# Patient Record
Sex: FEMALE | Race: WHITE | NOT HISPANIC OR LATINO | URBAN - NONMETROPOLITAN AREA
[De-identification: names, ages, dates, MRNs, and addresses within clinical notes are randomized per-mention and may not be internally consistent; named-entity substitution may affect disease eponyms.]

---

## 2017-07-25 ENCOUNTER — OFFICE VISIT (OUTPATIENT)
Dept: OBSTETRICS AND GYNECOLOGY | Facility: CLINIC | Age: 22
End: 2017-07-25

## 2017-07-25 VITALS
SYSTOLIC BLOOD PRESSURE: 116 MMHG | WEIGHT: 187 LBS | HEIGHT: 69 IN | DIASTOLIC BLOOD PRESSURE: 68 MMHG | BODY MASS INDEX: 27.7 KG/M2

## 2017-07-25 DIAGNOSIS — Z30.014 ENCOUNTER FOR INITIAL PRESCRIPTION OF INTRAUTERINE CONTRACEPTIVE DEVICE: Primary | ICD-10-CM

## 2017-07-25 PROCEDURE — 99204 OFFICE O/P NEW MOD 45 MIN: CPT | Performed by: PHYSICIAN ASSISTANT

## 2017-07-25 RX ORDER — FEXOFENADINE HYDROCHLORIDE 60 MG/1
60 TABLET, FILM COATED ORAL DAILY
COMMUNITY

## 2017-07-25 NOTE — PROGRESS NOTES
"Subjective   Chief Complaint   Patient presents with   • Contraception     iud consult     /68  Ht 69\" (175.3 cm)  Wt 187 lb (84.8 kg)  LMP 2017  BMI 27.62 kg/m2   Stefany Diaz is a 21 y.o. year old new patient  presenting to be seen for consultation regarding contraception  She has been on oc's in past and d/c these 2016  She is interested in IUD-wants convenient birth control  Menses regular and LMP  2017  Sexually active-monogamous    Past Medical History:   Diagnosis Date   • Anxiety    • Depression         Current Outpatient Prescriptions:   •  fexofenadine (ALLEGRA) 60 MG tablet, Take 60 mg by mouth Daily., Disp: , Rfl:   •  Multiple Vitamins-Minerals (MULTIVITAMIN ADULT PO), Take  by mouth., Disp: , Rfl:     Current Facility-Administered Medications:   •  [START ON 2017] Levonorgestrel 13.5 MG IUD 1 each, 1 each, Intrauterine, Once, Marlyn Baxter PA-C   No Known Allergies   Past Surgical History:   Procedure Laterality Date   • WISDOM TOOTH EXTRACTION        Social History     Social History   • Marital status: Single     Spouse name: N/A   • Number of children: N/A   • Years of education: N/A     Occupational History   • Not on file.     Social History Main Topics   • Smoking status: Never Smoker   • Smokeless tobacco: Never Used   • Alcohol use No   • Drug use: No   • Sexual activity: Yes     Birth control/ protection: None     Other Topics Concern   • Not on file     Social History Narrative   • No narrative on file      Family History   Problem Relation Age of Onset   • Hypertension Father        The following portions of the patient's history were reviewed and updated as appropriate:problem list, current medications, allergies, past family history, past medical history, past social history and past surgical history.    Review of Systems   HENT: Negative.    Gastrointestinal: Negative.    Genitourinary: Negative.    All other systems reviewed and are " negative.       Objective     Physical Exam   Constitutional: She appears well-developed and well-nourished. She is cooperative.   Eyes: Conjunctivae and lids are normal.   Neck: No thyroid mass and no thyromegaly present.   Cardiovascular: Normal rate, regular rhythm and normal heart sounds.    Pulmonary/Chest: Effort normal and breath sounds normal.   Abdominal: Soft. Normal appearance. She exhibits no distension and no mass. There is no tenderness.   Genitourinary: Vagina normal and uterus normal. There is no tenderness or lesion on the right labia. There is no tenderness or lesion on the left labia. Cervix exhibits no motion tenderness and no discharge. Right adnexum displays no mass and no tenderness. Left adnexum displays no mass and no tenderness.   Skin: Skin is warm, dry and intact.   Psychiatric: She has a normal mood and affect. Her behavior is normal.     Stefany was seen today for contraception.    Diagnoses and all orders for this visit:    Encounter for initial prescription of intrauterine contraceptive device  -     Levonorgestrel 13.5 MG IUD 1 each; 1 each by Intrauterine route 1 (One) Time.  -     Chlamydia Trachomatis, Neisseria Gonorrhoeae, Trichomonas Vaginalis, GLORIA             This note was electronically signed.    Marlyn Baxter PA-C   July 25, 2017

## 2017-07-25 NOTE — PATIENT INSTRUCTIONS
Discussed all options for contraception-patient desires Elba-discussed benefits/side effects  Screening for GC/CT done prior to IUD insertion  Call first day of menses for Elba insertion

## 2017-07-26 LAB
C TRACH RRNA SPEC QL NAA+PROBE: NEGATIVE
N GONORRHOEA RRNA SPEC QL NAA+PROBE: NEGATIVE
T VAGINALIS RRNA SPEC QL NAA+PROBE: NEGATIVE

## 2017-07-26 NOTE — PROGRESS NOTES
I have reviewed the notes, assessments, and/or procedures performed by   Diana Bxater, I concur with her/his documentation of Stefany Diaz.

## 2017-08-03 ENCOUNTER — OFFICE VISIT (OUTPATIENT)
Dept: OBSTETRICS AND GYNECOLOGY | Facility: CLINIC | Age: 22
End: 2017-08-03

## 2017-08-03 VITALS
BODY MASS INDEX: 27.7 KG/M2 | DIASTOLIC BLOOD PRESSURE: 68 MMHG | WEIGHT: 187 LBS | SYSTOLIC BLOOD PRESSURE: 114 MMHG | HEIGHT: 69 IN

## 2017-08-03 DIAGNOSIS — Z30.430 ENCOUNTER FOR INSERTION OF INTRAUTERINE CONTRACEPTIVE DEVICE: Primary | ICD-10-CM

## 2017-08-03 LAB
B-HCG UR QL: NEGATIVE
INTERNAL NEGATIVE CONTROL: NEGATIVE
INTERNAL POSITIVE CONTROL: POSITIVE
Lab: NORMAL

## 2017-08-03 PROCEDURE — 81025 URINE PREGNANCY TEST: CPT | Performed by: PHYSICIAN ASSISTANT

## 2017-08-03 PROCEDURE — 58300 INSERT INTRAUTERINE DEVICE: CPT | Performed by: PHYSICIAN ASSISTANT

## 2017-08-03 NOTE — PROGRESS NOTES
IUD Insertion    Patient's last menstrual period was 07/30/2017.    Date of procedure:  8/3/2017    Risks and benefits discussed? yes  All questions answered? yes  Consents given by The patient  Written consent obtained? yes    Local anesthesia used:  no    Procedure documentation:    After verifying the patient had a low probability of being pregnant and met the criteria for insertion, a sterile speculum has placed and the cervix was cleansed with an antiseptic solution.  Vaginal discharge was scant.  The anterior lip of the cervix was grasped with a tenaculum and the uterine cavity was gently sounded. There was no difficulty passing the sound through the cervix.  Cervical dilation did not need to be performed prior to placing the IUD.  The uterus was anteverted and sounded to 8 cms.  The Elba IUD was then prepared per the manufacturers instructions.    The Elba was advanced to a point 2 cms from the fundus and then the arms were released from the sheath.  The device was advanced to the fundus and the device was released fully from the sheath.. The string was cut 3 cms in length.  Bleeding from the cervix was scant.    She tolerated the procedure without any difficulty.     Post procedure instructions:     Follow up in 5 weeks for IUD check. Call office if any heavy bleeding, significant pain or cramping, fever or chills     This note was electronically signed.    Marlyn Baxter PA-C  August 3, 2017

## 2017-09-14 ENCOUNTER — OFFICE VISIT (OUTPATIENT)
Dept: OBSTETRICS AND GYNECOLOGY | Facility: CLINIC | Age: 22
End: 2017-09-14

## 2017-09-14 VITALS
HEIGHT: 69 IN | WEIGHT: 185 LBS | DIASTOLIC BLOOD PRESSURE: 70 MMHG | SYSTOLIC BLOOD PRESSURE: 122 MMHG | BODY MASS INDEX: 27.4 KG/M2

## 2017-09-14 DIAGNOSIS — Z30.431 ENCOUNTER FOR ROUTINE CHECKING OF INTRAUTERINE CONTRACEPTIVE DEVICE: Primary | ICD-10-CM

## 2017-09-14 PROCEDURE — 99212 OFFICE O/P EST SF 10 MIN: CPT | Performed by: PHYSICIAN ASSISTANT

## 2017-09-14 NOTE — PROGRESS NOTES
"Subjective   Chief Complaint   Patient presents with   • Follow-up     Pt is here for IUD check. Pt has Elba.     /70  Ht 69\" (175.3 cm)  Wt 185 lb (83.9 kg)  LMP 2017 (Exact Date)  BMI 27.32 kg/m2   Stefany Diaz is a 21 y.o. year old  presenting to be seen for IUD follow up  She had Elba inserted 6 weeks ago  She reports has done well post insertion-had some cramping and spotting post insertion but resolved soonafter  Had menses about 2 weeks ago-lasted 8 days    Past Medical History:   Diagnosis Date   • Anxiety    • Depression         Current Outpatient Prescriptions:   •  fexofenadine (ALLEGRA) 60 MG tablet, Take 60 mg by mouth Daily., Disp: , Rfl:   •  Multiple Vitamins-Minerals (MULTIVITAMIN ADULT PO), Take  by mouth., Disp: , Rfl:     Current Facility-Administered Medications:   •  Levonorgestrel 13.5 MG IUD 1 each, 1 each, Intrauterine, Once, Marlyn Baxter PA-C   No Known Allergies   Past Surgical History:   Procedure Laterality Date   • WISDOM TOOTH EXTRACTION        Social History     Social History   • Marital status: Single     Spouse name: N/A   • Number of children: N/A   • Years of education: N/A     Occupational History   • Not on file.     Social History Main Topics   • Smoking status: Never Smoker   • Smokeless tobacco: Never Used   • Alcohol use No   • Drug use: No   • Sexual activity: Yes     Birth control/ protection: None     Other Topics Concern   • Not on file     Social History Narrative      Family History   Problem Relation Age of Onset   • Hypertension Father        The following portions of the patient's history were reviewed and updated as appropriate:problem list, current medications, allergies, past family history, past medical history, past social history and past surgical history.    Review of Systems   Constitutional: Negative.    Gastrointestinal: Negative.    Genitourinary: Negative.         Objective     Physical Exam   Constitutional: She appears " well-developed and well-nourished. She is cooperative.   Abdominal: Soft. Normal appearance. There is no tenderness. There is no rigidity and no guarding.   Genitourinary: Vagina normal and uterus normal. There is no tenderness or lesion on the right labia. There is no tenderness or lesion on the left labia. Cervix exhibits no motion tenderness and no discharge. Right adnexum displays no mass and no tenderness. Left adnexum displays no mass and no tenderness.   Genitourinary Comments: IUD strings noted    Neurological: She is alert.   Skin: Skin is warm, dry and intact.   Psychiatric: She has a normal mood and affect. Her behavior is normal.     Stefany was seen today for follow-up.    Diagnoses and all orders for this visit:    Encounter for routine checking of intrauterine contraceptive device           This note was electronically signed.    Marlyn Baxter PA-C   September 14, 2017